# Patient Record
Sex: FEMALE | Race: BLACK OR AFRICAN AMERICAN | NOT HISPANIC OR LATINO | Employment: FULL TIME | URBAN - METROPOLITAN AREA
[De-identification: names, ages, dates, MRNs, and addresses within clinical notes are randomized per-mention and may not be internally consistent; named-entity substitution may affect disease eponyms.]

---

## 2019-02-11 ENCOUNTER — OFFICE VISIT (OUTPATIENT)
Dept: OBSTETRICS AND GYNECOLOGY | Facility: CLINIC | Age: 49
End: 2019-02-11

## 2019-02-11 VITALS
BODY MASS INDEX: 20.89 KG/M2 | SYSTOLIC BLOOD PRESSURE: 112 MMHG | HEIGHT: 65 IN | WEIGHT: 125.4 LBS | DIASTOLIC BLOOD PRESSURE: 68 MMHG

## 2019-02-11 DIAGNOSIS — Z78.0 MENOPAUSE: ICD-10-CM

## 2019-02-11 DIAGNOSIS — Z01.419 WELL WOMAN EXAM: ICD-10-CM

## 2019-02-11 DIAGNOSIS — N94.10 DYSPAREUNIA, FEMALE: ICD-10-CM

## 2019-02-11 DIAGNOSIS — Z13.9 SCREENING FOR CONDITION: Primary | ICD-10-CM

## 2019-02-11 LAB
BILIRUB BLD-MCNC: NEGATIVE MG/DL
CLARITY, POC: CLEAR
COLOR UR: YELLOW
GLUCOSE UR STRIP-MCNC: NEGATIVE MG/DL
KETONES UR QL: NEGATIVE
LEUKOCYTE EST, POC: NEGATIVE
NITRITE UR-MCNC: NEGATIVE MG/ML
PH UR: 5 [PH] (ref 5–8)
PROT UR STRIP-MCNC: NEGATIVE MG/DL
RBC # UR STRIP: NEGATIVE /UL
SP GR UR: 1 (ref 1–1.03)
UROBILINOGEN UR QL: NORMAL

## 2019-02-11 PROCEDURE — 99386 PREV VISIT NEW AGE 40-64: CPT | Performed by: OBSTETRICS & GYNECOLOGY

## 2019-02-11 PROCEDURE — 81002 URINALYSIS NONAUTO W/O SCOPE: CPT | Performed by: OBSTETRICS & GYNECOLOGY

## 2019-02-11 RX ORDER — DIPHENHYDRAMINE HCL 25 MG
25 TABLET ORAL
COMMUNITY
End: 2020-02-24

## 2019-02-11 RX ORDER — ESTRADIOL 10 UG/1
1 INSERT VAGINAL 2 TIMES WEEKLY
Qty: 8 TABLET | Refills: 11 | Status: SHIPPED | OUTPATIENT
Start: 2019-02-11 | End: 2019-04-17

## 2019-02-11 RX ORDER — FLUTICASONE PROPIONATE 50 MCG
2 SPRAY, SUSPENSION (ML) NASAL DAILY
COMMUNITY
Start: 2016-02-26

## 2019-02-11 RX ORDER — AMOXICILLIN 400 MG/5ML
POWDER, FOR SUSPENSION ORAL
Refills: 0 | COMMUNITY
Start: 2019-02-06 | End: 2020-02-24

## 2019-02-11 RX ORDER — CETIRIZINE HYDROCHLORIDE 10 MG/1
10 TABLET ORAL DAILY
COMMUNITY
Start: 2016-08-26

## 2019-02-11 NOTE — PROGRESS NOTES
GYN Annual Exam     CC- Here for annual exam.     Pt new to practice? yes  Pt new to me? yes     HPI: History of Present Illness      Van Viramontes is a 48 y.o. female who presents for annual well woman exam. No LMP recorded. Patient is not currently having periods (Reason: Other). Pt went through early menopause and has dyspareunia and vaginal atrophy and would like to have that addressed today.     MAMMOGRAM UP TO DATE IF AGE APPROPRIATE?  no    COLONOSCOPY UP TO DATE IF AGE APPROPRIATE? na      PMHX:  Patient Active Problem List   Diagnosis   • Menopause   • Dyspareunia, female   ; otherwise none    OB History     No data available            Past Medical History:   Diagnosis Date   • Asthma due to environmental allergies    • IBS (irritable bowel syndrome)    • Post-menopausal        Past Surgical History:   Procedure Laterality Date   •  SECTION WITH TUBAL           Current Outpatient Medications:   •  cetirizine (ZYRTEC ALLERGY) 10 MG tablet, Take 10 mg by mouth Daily., Disp: , Rfl:   •  fluticasone (FLONASE) 50 MCG/ACT nasal spray, 2 sprays into the nostril(s) as directed by provider Daily., Disp: , Rfl:   •  amoxicillin (AMOXIL) 400 MG/5ML suspension, take 12.5 milliliters by mouth every 12 hours for 7 days, Disp: , Rfl: 0  •  baclofen (LIORESAL) 10 MG tablet, Take 1 tablet by mouth 3 (three) times a day., Disp: 15 tablet, Rfl: 0  •  diphenhydrAMINE (BENADRYL) 25 MG tablet, Take 25 mg by mouth., Disp: , Rfl:   •  estradiol (VAGIFEM) 10 MCG tablet vaginal tablet, Insert 1 tablet into the vagina 2 (Two) Times a Week., Disp: 8 tablet, Rfl: 11    Allergies   Allergen Reactions   • Aspirin    • Darvon [Propoxyphene]        Social History     Tobacco Use   • Smoking status: Never Smoker   • Smokeless tobacco: Never Used   Substance Use Topics   • Alcohol use: No   • Drug use: No       I advised Van of the risks of continuing to use tobacco, and I provided her with tobacco cessation educational materials  "in the After Visit Summary.     During this visit, I spent 3-5 minutes counseling the patient regarding tobacco cessation.      History reviewed. No pertinent family history.    Review of Systems        EXAM:  /68   Ht 165.1 cm (65\")   Wt 56.9 kg (125 lb 6.4 oz)   BMI 20.87 kg/m²     Physical Exam   Constitutional: She is oriented to person, place, and time. She appears well-developed and well-nourished.   HENT:   Head: Normocephalic and atraumatic.   Eyes: EOM are normal.   Neck: Normal range of motion. Neck supple. No thyromegaly present.   Cardiovascular: Normal rate and regular rhythm.   Pulmonary/Chest: Effort normal and breath sounds normal. Right breast exhibits no mass and no nipple discharge. Left breast exhibits no mass and no nipple discharge. Breasts are symmetrical. There is no breast swelling.   Abdominal: Soft. Bowel sounds are normal. She exhibits no distension and no mass. There is no tenderness. There is no rebound and no guarding.   Genitourinary: Uterus normal. No breast tenderness, discharge or bleeding. Pelvic exam was performed with patient prone. There is no lesion on the right labia. There is no lesion on the left labia. Cervix exhibits no motion tenderness and no discharge. Right adnexum displays no mass. Left adnexum displays no mass.   Genitourinary Comments: cx wnl, pap done  Vaginal atrophy noted   Musculoskeletal: Normal range of motion. She exhibits no edema.   Neurological: She is alert and oriented to person, place, and time.   Skin: Skin is warm and dry.   Breasts wnl bilaterally   Psychiatric: She has a normal mood and affect. Her behavior is normal. Judgment and thought content normal.   Nursing note and vitals reviewed.         As part of wellness and prevention, the following topics were discussed with the patient:  []  Nutrition  []  Physical activity/regular exercise   [x]  Healthy weight  []  Injury prevention  [x]  Substance misuse/abuse  []  Sexual behavior  []  " STD prevention  []  Contaception  []  Dental health  [x]  Mental health  []  Immunization  [x]  Encouraged SBE     Counseling and guidance done:  Nutrition, physical activity, healthy weight, injury prevention, misuse of tobacco, alcohol and drugs, sexual behavior and STDs, contraception, dental health, mental health, immunizations breast cancer screening and exams.    Assessment     1) GYN annual well woman exam.   2) PAP done today? yes  3) problems addressed: vaginal dryness, dyspareunia       Plan       Follow up prn and one year.    Van was seen today for gynecologic exam.    Diagnoses and all orders for this visit:    Screening for condition  -     POC Urinalysis Dipstick  -     Mammo Screening Digital Tomosynthesis Bilateral With CAD; Future    Well woman exam  -     PapIG, HPV, Rfx 16 / 18    Menopause    Dyspareunia, female    Other orders  -     estradiol (VAGIFEM) 10 MCG tablet vaginal tablet; Insert 1 tablet into the vagina 2 (Two) Times a Week.        RTO Return in about 1 year (around 2/11/2020) for Annual physical.        Kee Arias MD  4:14 PM

## 2019-02-13 LAB
CYTOLOGIST CVX/VAG CYTO: NORMAL
CYTOLOGY CVX/VAG DOC THIN PREP: NORMAL
DX ICD CODE: NORMAL
HIV 1 & 2 AB SER-IMP: NORMAL
HPV I/H RISK 1 DNA CVX QL PROBE+SIG AMP: NEGATIVE
OTHER STN SPEC: NORMAL
PATH REPORT.FINAL DX SPEC: NORMAL
STAT OF ADQ CVX/VAG CYTO-IMP: NORMAL

## 2019-02-15 ENCOUNTER — HOSPITAL ENCOUNTER (OUTPATIENT)
Dept: MAMMOGRAPHY | Facility: HOSPITAL | Age: 49
Discharge: HOME | End: 2019-02-15
Admitting: OBSTETRICS & GYNECOLOGY

## 2019-02-15 DIAGNOSIS — Z13.9 SCREENING FOR CONDITION: ICD-10-CM

## 2019-02-15 PROCEDURE — 77067 SCR MAMMO BI INCL CAD: CPT

## 2019-02-15 PROCEDURE — 77063 BREAST TOMOSYNTHESIS BI: CPT

## 2019-04-16 ENCOUNTER — TELEPHONE (OUTPATIENT)
Dept: OBSTETRICS AND GYNECOLOGY | Facility: CLINIC | Age: 49
End: 2019-04-16

## 2019-04-16 NOTE — TELEPHONE ENCOUNTER
She states the vagifem is not working for her, she states she has had no change at all in the 3 months she has been using it. Wants to know what she can do now.

## 2019-04-17 RX ORDER — ESTRADIOL 0.1 MG/G
1 CREAM VAGINAL 2 TIMES WEEKLY
Qty: 45 G | Refills: 6 | Status: SHIPPED | OUTPATIENT
Start: 2019-04-18 | End: 2020-02-24 | Stop reason: SDUPTHER

## 2019-04-17 NOTE — TELEPHONE ENCOUNTER
I can do premarin cream or estrace cream, but insurance usually doesn't pay for it. If she uses vagifem twice weekly, that usually helps.  Is she not interested?

## 2020-02-24 ENCOUNTER — OFFICE VISIT (OUTPATIENT)
Dept: OBSTETRICS AND GYNECOLOGY | Facility: CLINIC | Age: 50
End: 2020-02-24

## 2020-02-24 VITALS
WEIGHT: 128 LBS | DIASTOLIC BLOOD PRESSURE: 70 MMHG | HEIGHT: 65 IN | SYSTOLIC BLOOD PRESSURE: 110 MMHG | BODY MASS INDEX: 21.33 KG/M2

## 2020-02-24 DIAGNOSIS — Z78.0 MENOPAUSE: ICD-10-CM

## 2020-02-24 DIAGNOSIS — Z01.419 WELL WOMAN EXAM: Primary | ICD-10-CM

## 2020-02-24 PROCEDURE — 99396 PREV VISIT EST AGE 40-64: CPT | Performed by: OBSTETRICS & GYNECOLOGY

## 2020-02-24 PROCEDURE — 81002 URINALYSIS NONAUTO W/O SCOPE: CPT | Performed by: OBSTETRICS & GYNECOLOGY

## 2020-02-24 RX ORDER — ESTRADIOL 0.1 MG/G
1 CREAM VAGINAL 2 TIMES WEEKLY
Qty: 45 G | Refills: 6 | Status: SHIPPED | OUTPATIENT
Start: 2020-02-24

## 2020-02-24 NOTE — PROGRESS NOTES
"GYN Annual Exam     CC- Here for annual exam.     Pt new to practice? No  Pt new to me? No     HPI: History of Present Illness      Van Viramontes is a 49 y.o. female who presents for annual well woman exam. No LMP recorded. (Menstrual status: Other).    Problems in addition to need for annual: none    MAMMOGRAM UP TO DATE IF AGE APPROPRIATE?  No pt to schedule    COLONOSCOPY UP TO DATE IF AGE APPROPRIATE? No    Fhx breast cancer? No    Fhx ovarian cancer? No    Fhx colon cancer? No    Invitae testing offered? No      PMHX:  Patient Active Problem List   Diagnosis   • Menopause   • Dyspareunia, female   ; otherwise none    OB History        2    Para   2    Term   2            AB        Living           SAB        TAB        Ectopic        Molar        Multiple        Live Births                      Past Medical History:   Diagnosis Date   • Asthma due to environmental allergies    • IBS (irritable bowel syndrome)    • Post-menopausal        Past Surgical History:   Procedure Laterality Date   •  SECTION WITH TUBAL           Current Outpatient Medications:   •  cetirizine (ZYRTEC ALLERGY) 10 MG tablet, Take 10 mg by mouth Daily., Disp: , Rfl:   •  estradiol (ESTRACE) 0.1 MG/GM vaginal cream, Insert 1 g into the vagina 2 (Two) Times a Week., Disp: 45 g, Rfl: 6  •  fluticasone (FLONASE) 50 MCG/ACT nasal spray, 2 sprays into the nostril(s) as directed by provider Daily., Disp: , Rfl:     Allergies   Allergen Reactions   • Aspirin Nausea And Vomiting   • Propoxyphene Nausea And Vomiting       Social History     Tobacco Use   • Smoking status: Never Smoker   • Smokeless tobacco: Never Used   Substance Use Topics   • Alcohol use: No   • Drug use: No       Smoker: No    History reviewed. No pertinent family history.    Review of Systems        EXAM:  /70   Ht 165.1 cm (65\")   Wt 58.1 kg (128 lb)   BMI 21.30 kg/m²     Physical Exam   Constitutional: She is oriented to person, place, and time. " She appears well-developed and well-nourished. No distress.   HENT:   Head: Normocephalic and atraumatic.   Eyes: EOM are normal.   Neck: Normal range of motion.   Cardiovascular: Normal rate.   Pulmonary/Chest: Effort normal.   Abdominal: Soft. She exhibits no distension and no mass. There is no tenderness. There is no guarding.   Genitourinary: Vagina normal and uterus normal. No vaginal discharge found.   Genitourinary Comments: cx wnl, pap done   Musculoskeletal: Normal range of motion. She exhibits no edema, tenderness or deformity.   Neurological: She is alert and oriented to person, place, and time.   Skin: Skin is warm and dry. No rash noted. She is not diaphoretic. No erythema. No pallor.   Breasts wnl bilaterally   Psychiatric: She has a normal mood and affect. Her behavior is normal. Judgment and thought content normal.   Nursing note and vitals reviewed.         As part of wellness and prevention, the following topics were discussed with the patient:  []  Nutrition  []  Physical activity/regular exercise   [x]  Healthy weight  []  Injury prevention  [x]  Substance misuse/abuse  []  Sexual behavior  []  STD prevention  []  Contaception  []  Dental health  [x]  Mental health  []  Immunization  [x]  Encouraged SBE     Counseling and guidance done:  Nutrition, physical activity, healthy weight, injury prevention, misuse of tobacco, alcohol and drugs, sexual behavior and STDs, contraception, dental health, mental health, immunizations breast cancer screening and exams.    Assessment     1) GYN annual well woman exam.   2) PAP done today? No  3) problems addressed: none       Plan       Follow up prn or one year.    Van was seen today for gynecologic exam.    Diagnoses and all orders for this visit:    Well woman exam  -     POC Urinalysis Dipstick    Menopause    Other orders  -     estradiol (ESTRACE) 0.1 MG/GM vaginal cream; Insert 1 g into the vagina 2 (Two) Times a Week.        RTO Return in about 1  year (around 2/24/2021) for Annual physical.    .       Kee Arias MD  [unfilled]  4:27 PM

## 2021-04-06 ENCOUNTER — BULK ORDERING (OUTPATIENT)
Dept: CASE MANAGEMENT | Facility: OTHER | Age: 51
End: 2021-04-06

## 2021-04-06 DIAGNOSIS — Z23 IMMUNIZATION DUE: ICD-10-CM

## 2025-03-06 ENCOUNTER — OFFICE (OUTPATIENT)
Dept: URBAN - METROPOLITAN AREA CLINIC 64 | Facility: CLINIC | Age: 55
End: 2025-03-06
Payer: COMMERCIAL

## 2025-03-06 VITALS
DIASTOLIC BLOOD PRESSURE: 69 MMHG | SYSTOLIC BLOOD PRESSURE: 122 MMHG | HEART RATE: 56 BPM | WEIGHT: 119 LBS | HEIGHT: 65 IN

## 2025-03-06 DIAGNOSIS — R68.81 EARLY SATIETY: ICD-10-CM

## 2025-03-06 DIAGNOSIS — R14.0 ABDOMINAL DISTENSION (GASEOUS): ICD-10-CM

## 2025-03-06 DIAGNOSIS — K21.9 GASTRO-ESOPHAGEAL REFLUX DISEASE WITHOUT ESOPHAGITIS: ICD-10-CM

## 2025-03-06 DIAGNOSIS — R11.0 NAUSEA: ICD-10-CM

## 2025-03-06 PROCEDURE — 99204 OFFICE O/P NEW MOD 45 MIN: CPT | Performed by: INTERNAL MEDICINE

## 2025-03-10 ENCOUNTER — ON CAMPUS - OUTPATIENT (OUTPATIENT)
Dept: URBAN - METROPOLITAN AREA HOSPITAL 2 | Facility: HOSPITAL | Age: 55
End: 2025-03-10
Payer: COMMERCIAL

## 2025-03-10 ENCOUNTER — OFFICE (OUTPATIENT)
Dept: URBAN - METROPOLITAN AREA PATHOLOGY 19 | Facility: PATHOLOGY | Age: 55
End: 2025-03-10
Payer: COMMERCIAL

## 2025-03-10 VITALS
RESPIRATION RATE: 16 BRPM | OXYGEN SATURATION: 100 % | HEIGHT: 65 IN | HEART RATE: 73 BPM | WEIGHT: 119 LBS | OXYGEN SATURATION: 98 % | SYSTOLIC BLOOD PRESSURE: 110 MMHG | DIASTOLIC BLOOD PRESSURE: 59 MMHG | HEART RATE: 71 BPM | DIASTOLIC BLOOD PRESSURE: 64 MMHG | DIASTOLIC BLOOD PRESSURE: 76 MMHG | OXYGEN SATURATION: 99 % | TEMPERATURE: 97.5 F | HEART RATE: 74 BPM | DIASTOLIC BLOOD PRESSURE: 66 MMHG | SYSTOLIC BLOOD PRESSURE: 108 MMHG | HEART RATE: 59 BPM | HEART RATE: 60 BPM | HEART RATE: 58 BPM | DIASTOLIC BLOOD PRESSURE: 100 MMHG | SYSTOLIC BLOOD PRESSURE: 111 MMHG | SYSTOLIC BLOOD PRESSURE: 121 MMHG | SYSTOLIC BLOOD PRESSURE: 101 MMHG | SYSTOLIC BLOOD PRESSURE: 98 MMHG | DIASTOLIC BLOOD PRESSURE: 67 MMHG | RESPIRATION RATE: 18 BRPM | HEART RATE: 76 BPM

## 2025-03-10 DIAGNOSIS — R11.0 NAUSEA: ICD-10-CM

## 2025-03-10 DIAGNOSIS — K25.9 GASTRIC ULCER, UNSPECIFIED AS ACUTE OR CHRONIC, WITHOUT HEMO: ICD-10-CM

## 2025-03-10 DIAGNOSIS — R14.0 ABDOMINAL DISTENSION (GASEOUS): ICD-10-CM

## 2025-03-10 DIAGNOSIS — K29.50 UNSPECIFIED CHRONIC GASTRITIS WITHOUT BLEEDING: ICD-10-CM

## 2025-03-10 DIAGNOSIS — K31.89 OTHER DISEASES OF STOMACH AND DUODENUM: ICD-10-CM

## 2025-03-10 DIAGNOSIS — K22.9 DISEASE OF ESOPHAGUS, UNSPECIFIED: ICD-10-CM

## 2025-03-10 DIAGNOSIS — K44.9 DIAPHRAGMATIC HERNIA WITHOUT OBSTRUCTION OR GANGRENE: ICD-10-CM

## 2025-03-10 DIAGNOSIS — K31.7 POLYP OF STOMACH AND DUODENUM: ICD-10-CM

## 2025-03-10 DIAGNOSIS — K21.00 GASTRO-ESOPHAGEAL REFLUX DISEASE WITH ESOPHAGITIS, WITHOUT B: ICD-10-CM

## 2025-03-10 DIAGNOSIS — R68.81 EARLY SATIETY: ICD-10-CM

## 2025-03-10 PROBLEM — K22.89 OTHER SPECIFIED DISEASE OF ESOPHAGUS: Status: ACTIVE | Noted: 2025-03-10

## 2025-03-10 PROBLEM — K29.70 GASTRITIS, UNSPECIFIED, WITHOUT BLEEDING: Status: ACTIVE | Noted: 2025-03-10

## 2025-03-10 LAB
GI HISTOLOGY: A. SECOND PART OF THE DUODENUM: (no result)
GI HISTOLOGY: B. STOMACH ANTRUM: (no result)
GI HISTOLOGY: C. STOMACH BODY: (no result)
GI HISTOLOGY: PDF REPORT: (no result)
Lab: (no result)

## 2025-03-10 PROCEDURE — 88312 SPECIAL STAINS GROUP 1: CPT | Performed by: PATHOLOGY

## 2025-03-10 PROCEDURE — 43239 EGD BIOPSY SINGLE/MULTIPLE: CPT | Performed by: INTERNAL MEDICINE

## 2025-03-10 PROCEDURE — 88305 TISSUE EXAM BY PATHOLOGIST: CPT | Performed by: PATHOLOGY

## 2025-03-10 PROCEDURE — 88342 IMHCHEM/IMCYTCHM 1ST ANTB: CPT | Performed by: PATHOLOGY

## 2025-03-20 ENCOUNTER — TRANSCRIBE ORDERS (OUTPATIENT)
Dept: ADMINISTRATIVE | Facility: HOSPITAL | Age: 55
End: 2025-03-20
Payer: COMMERCIAL

## 2025-03-20 DIAGNOSIS — K30 MILD DIETARY INDIGESTION: Primary | ICD-10-CM

## 2025-03-20 DIAGNOSIS — K21.9 GASTROESOPHAGEAL REFLUX DISEASE, UNSPECIFIED WHETHER ESOPHAGITIS PRESENT: ICD-10-CM

## 2025-03-20 DIAGNOSIS — R11.0 NAUSEA: ICD-10-CM

## 2025-03-20 DIAGNOSIS — R68.81 EARLY SATIETY: ICD-10-CM

## 2025-03-20 DIAGNOSIS — R14.0 BLOATING: ICD-10-CM

## 2025-04-01 ENCOUNTER — HOSPITAL ENCOUNTER (OUTPATIENT)
Dept: NUCLEAR MEDICINE | Facility: HOSPITAL | Age: 55
Discharge: HOME | End: 2025-04-01
Payer: COMMERCIAL

## 2025-04-01 DIAGNOSIS — K21.9 GASTROESOPHAGEAL REFLUX DISEASE, UNSPECIFIED WHETHER ESOPHAGITIS PRESENT: ICD-10-CM

## 2025-04-01 DIAGNOSIS — R11.0 NAUSEA: ICD-10-CM

## 2025-04-01 DIAGNOSIS — K30 MILD DIETARY INDIGESTION: ICD-10-CM

## 2025-04-01 DIAGNOSIS — R14.0 BLOATING: ICD-10-CM

## 2025-04-01 DIAGNOSIS — R68.81 EARLY SATIETY: ICD-10-CM

## 2025-04-01 PROCEDURE — A9541 TC99M SULFUR COLLOID: HCPCS | Performed by: INTERNAL MEDICINE

## 2025-04-01 PROCEDURE — 34310000005 TECHNETIUM SULFUR COLLOID: Performed by: INTERNAL MEDICINE

## 2025-04-01 PROCEDURE — 78264 GASTRIC EMPTYING IMG STUDY: CPT

## 2025-04-01 RX ADMIN — TECHNETIUM TC 99M SULFUR COLLOID 1 DOSE: KIT at 08:01
